# Patient Record
Sex: FEMALE | Race: BLACK OR AFRICAN AMERICAN | NOT HISPANIC OR LATINO | Employment: FULL TIME | URBAN - METROPOLITAN AREA
[De-identification: names, ages, dates, MRNs, and addresses within clinical notes are randomized per-mention and may not be internally consistent; named-entity substitution may affect disease eponyms.]

---

## 2021-07-19 ENCOUNTER — OFFICE VISIT (OUTPATIENT)
Dept: URGENT CARE | Age: 26
End: 2021-07-19
Payer: COMMERCIAL

## 2021-07-19 VITALS
RESPIRATION RATE: 16 BRPM | DIASTOLIC BLOOD PRESSURE: 80 MMHG | TEMPERATURE: 97.5 F | OXYGEN SATURATION: 99 % | SYSTOLIC BLOOD PRESSURE: 131 MMHG | HEART RATE: 79 BPM

## 2021-07-19 DIAGNOSIS — N93.9 VAGINAL BLEEDING: Primary | ICD-10-CM

## 2021-07-19 DIAGNOSIS — D50.9 IRON DEFICIENCY ANEMIA, UNSPECIFIED IRON DEFICIENCY ANEMIA TYPE: ICD-10-CM

## 2021-07-19 PROCEDURE — G0382 LEV 3 HOSP TYPE B ED VISIT: HCPCS | Performed by: NURSE PRACTITIONER

## 2021-07-19 PROCEDURE — 99283 EMERGENCY DEPT VISIT LOW MDM: CPT | Performed by: NURSE PRACTITIONER

## 2021-07-19 NOTE — PROGRESS NOTES
[facility]        NAME: Willie Hollins is a 23 y o  female  : 1995    MRN: [id number]  DATE: 2021  TIME: 4:11 PM    Assessment and Plan   Vaginal bleeding [N93 9]  1  Vaginal bleeding     2  Iron deficiency anemia, unspecified iron deficiency anemia type           Patient Instructions       Go to the ED for further eval    Chief Complaint     Chief Complaint   Patient presents with    Vaginal Bleeding     heavy vaginal bleeding x 4 months; LLQ pain x 1 week; GYN is closed until tomorrow    Abdominal Pain         History of Present Illness       HPI   Reports vaginal bleed x 4 months, continually  Uses about 8-10 pads a day  Started having abdominal pain 1 5 - 2 weeks ago  Reports Hx of anemia, and was supposed to be on iron  Says she is not taking bc it irritates he stomach  Had some dizziness a couple of days ago    Review of Systems   Review of Systems   Respiratory: Negative for shortness of breath  Cardiovascular: Negative for chest pain and palpitations  Gastrointestinal: Negative for anal bleeding  Genitourinary: Positive for vaginal bleeding  Negative for difficulty urinating, vaginal discharge and vaginal pain  Neurological: Positive for light-headedness           Current Medications       Current Outpatient Medications:     Lisdexamfetamine Dimesylate (VYVANSE PO), Take by mouth, Disp: , Rfl:     Current Allergies     Allergies as of 2021 - never reviewed   Allergen Reaction Noted    Lavender oil Other (See Comments) 2021    Pineapple - food allergy Other (See Comments) 2021            The following portions of the patient's history were reviewed and updated as appropriate: allergies, current medications, past family history, past medical history, past social history, past surgical history and problem list      Past Medical History:   Diagnosis Date    ADHD (attention deficit hyperactivity disorder)     Anemia     PCOS (polycystic ovarian syndrome)        History reviewed  No pertinent surgical history  History reviewed  No pertinent family history  Medications have been verified  Objective   /80   Pulse 79   Temp 97 5 °F (36 4 °C)   Resp 16   SpO2 99%   No LMP recorded  Physical Exam     Physical Exam  Constitutional:       Appearance: She is obese  Cardiovascular:      Rate and Rhythm: Regular rhythm  Pulmonary:      Effort: Pulmonary effort is normal       Breath sounds: Normal breath sounds     Genitourinary:     Comments: deferred

## 2021-07-19 NOTE — PATIENT INSTRUCTIONS
Abnormal (Dysfunctional) Uterine Bleeding   WHAT YOU NEED TO KNOW:   Abnormal uterine bleeding (AUB) is uterine bleeding that is not usual for you  It may also be called dysfunctional uterine bleeding  You may have bleeding from your uterus at times other than your normal monthly period  Your monthly periods may last longer or shorter, and bleeding may be heavier or lighter than usual  AUB can be acute (lasting a short time) or chronic (lasting longer than 6 months)  DISCHARGE INSTRUCTIONS:   Return to the emergency department if:   · You continue to bleed heavily, or you feel faint  Call your doctor or gynecologist if:   · You need to change your sanitary pad or tampon more than 1 time each hour  · Your medicine causes nausea, vomiting, or diarrhea  · You have questions or concerns about your condition or care  Medicines: You may need any of the following:  · Hormones  help decrease bleeding by making your monthly periods more regular  Sometimes this medicine may be given as birth control pills  · Iron supplements  may be given if your blood iron level decreases because of heavy bleeding  Iron may make you constipated  Ask your healthcare provider for ways to prevent or treat constipation  Iron may also make your bowel movements turn dark or black  · Take your medicine as directed  Contact your healthcare provider if you think your medicine is not helping or if you have side effects  Tell him or her if you are allergic to any medicine  Keep a list of the medicines, vitamins, and herbs you take  Include the amounts, and when and why you take them  Bring the list or the pill bottles to follow-up visits  Carry your medicine list with you in case of an emergency  Self-care:   · Apply heat on your lower abdomen to decrease pain and muscle spasms  Apply heat for 20 to 30 minutes every 2 hours for as many days as directed  · Include foods high in iron if needed    Examples of foods high in iron are leafy green vegetables, beef, pork, liver, eggs, and whole-grain breads and cereals  · Keep a diary of your menstrual cycles  Keep track of the number of tampons or pads you use each day  · Talk to your healthcare provider before you start a weight loss program   You may need to wait until the abnormal bleeding has stopped before you try to lose weight  The amount of iron in your blood should be normal before you lose weight  Ask your provider if weight loss will help your AUB  He or she can tell you what weight is healthy for you  He or she can help you create a safe weight loss plan, if needed  Follow up with your doctor or gynecologist as directed: You may need to return in 4 to 6 months so your provider knows if the AUB has stopped  Bring the diary of your menstrual cycles to your follow-up visits  Write down your questions so you remember to ask them during your visits  © [employer] 2021 Information is for End User's use only and may not be sold, redistributed or otherwise used for commercial purposes  All illustrations and images included in CareNotes® are the copyrighted property of [employer]  or [facility] Hay Alcazar   The above information is an  only  It is not intended as medical advice for individual conditions or treatments  Talk to your doctor, nurse or pharmacist before following any medical regimen to see if it is safe and effective for you